# Patient Record
Sex: MALE | Race: BLACK OR AFRICAN AMERICAN | Employment: FULL TIME | URBAN - METROPOLITAN AREA
[De-identification: names, ages, dates, MRNs, and addresses within clinical notes are randomized per-mention and may not be internally consistent; named-entity substitution may affect disease eponyms.]

---

## 2021-06-23 ENCOUNTER — OFFICE VISIT (OUTPATIENT)
Dept: ORTHOPEDIC SURGERY | Age: 31
End: 2021-06-23

## 2021-06-23 VITALS
TEMPERATURE: 97.7 F | OXYGEN SATURATION: 100 % | BODY MASS INDEX: 20.05 KG/M2 | WEIGHT: 143.2 LBS | HEART RATE: 64 BPM | HEIGHT: 71 IN

## 2021-06-23 DIAGNOSIS — S60.221A CONTUSION OF RIGHT HAND, INITIAL ENCOUNTER: Primary | ICD-10-CM

## 2021-06-23 DIAGNOSIS — M25.531 RIGHT WRIST PAIN: ICD-10-CM

## 2021-06-23 PROCEDURE — 73110 X-RAY EXAM OF WRIST: CPT | Performed by: ORTHOPAEDIC SURGERY

## 2021-06-23 PROCEDURE — 99203 OFFICE O/P NEW LOW 30 MIN: CPT | Performed by: ORTHOPAEDIC SURGERY

## 2021-06-23 NOTE — LETTER
NOTIFICATION RETURN TO WORK / SCHOOL    6/23/2021 8:49 AM    Mr. Faisal Benavidez [address] [facility] [address] [phone]      To Whom It May Concern:    Constantine Edmond is currently under the care of [address] [place] [address]. Patient was seen in our office today, he will be out of work until further notice. If there are questions or concerns please have the patient contact our office.         Sincerely,      Maria M Banda, DO

## 2021-06-23 NOTE — PROGRESS NOTES
Mague Cazares is a 26 y.o. male right handed . Worker's Compensation and legal considerations: none filed. Vitals:    06/23/21 0817   Pulse: 64   Temp: 97.7 °F (36.5 °C)   TempSrc: Temporal   SpO2: 100%   Weight: 143 lb 3.2 oz (65 kg)   Height: 5' 11\" (1.803 m)   PainSc:   8           Chief Complaint   Patient presents with    Thumb Pain     Right         HPI: Patient presents today with a history of fall onto his right hand with pain at his thenar eminence and base of his thumb ever since. Date of onset: Approximately 6/14/2021    Injury: Yes: Comment: Fall    Prior Treatment:  No    Numbness/ Tingling: No      ROS: Review of Systems - General ROS: negative  Psychological ROS: negative  ENT ROS: negative  Allergy and Immunology ROS: negative  Hematological and Lymphatic ROS: negative  Respiratory ROS: no cough, shortness of breath, or wheezing  Cardiovascular ROS: no chest pain or dyspnea on exertion  Gastrointestinal ROS: no abdominal pain, change in bowel habits, or black or bloody stools  Musculoskeletal ROS: positive for - pain in hand - right  Neurological ROS: negative  Dermatological ROS: negative    No past medical history on file. No past surgical history on file. No Known Allergies        PE:     Physical Exam  Vitals and nursing note reviewed. Constitutional:       General: He is not in acute distress. Appearance: Normal appearance. He is not ill-appearing. Cardiovascular:      Pulses: Normal pulses. Pulmonary:      Effort: Pulmonary effort is normal.   Musculoskeletal:         General: Swelling and tenderness present. No deformity or signs of injury. Normal range of motion. Cervical back: Normal range of motion. Right lower leg: No edema. Left lower leg: No edema. Skin:     General: Skin is warm and dry. Capillary Refill: Capillary refill takes less than 2 seconds. Findings: No bruising or erythema.    Neurological:      General: No focal deficit present. Mental Status: He is alert and oriented to person, place, and time. Psychiatric:         Mood and Affect: Mood normal.         Behavior: Behavior normal.            Right hand: There is exquisite tenderness to palpation about the thenar eminence. There is mild tenderness to palpation about the anatomic snuffbox. Sensation is intact distally and cap refill is brisk. Range of motion is near full in all digits. Imagin2021 3 views of right wrist including scaphoid view does not show any fracture or dislocation or any other osseous abnormalities. ICD-10-CM ICD-9-CM    1. Contusion of right hand, initial encounter  S60.221A 923.20 MRI HAND RT WO CONT      AMB SUPPLY ORDER   2. Right wrist pain  M25.531 719.43 AMB POC XRAY, WRIST; COMPLETE, 3+ VIE         Plan:     MRI right hand without contrast and right thumb spica brace. We will rule out a possible scaphoid fracture versus other soft tissue injury in the right hand. Follow-up and Dispositions    · Return for MRI review.           Plan was reviewed with patient, who verbalized agreement and understanding of the plan

## 2021-06-29 ENCOUNTER — TELEPHONE (OUTPATIENT)
Dept: ORTHOPEDIC SURGERY | Age: 31
End: 2021-06-29

## 2021-06-29 DIAGNOSIS — S60.221A CONTUSION OF RIGHT HAND, INITIAL ENCOUNTER: Primary | ICD-10-CM

## 2021-06-29 NOTE — TELEPHONE ENCOUNTER
madhu Apple (on HIPAA), is asking if we can send the order for the MRI of the right hand to [facility]. She is also asking if this can be done as a STAT order.       Ms. Jamison Honey [phone]

## 2021-06-30 ENCOUNTER — HOSPITAL ENCOUNTER (OUTPATIENT)
Dept: MRI IMAGING | Age: 31
Discharge: HOME | End: 2021-06-30
Attending: ORTHOPAEDIC SURGERY

## 2021-06-30 VITALS — BODY MASS INDEX: 20.92 KG/M2 | WEIGHT: 150 LBS

## 2021-06-30 DIAGNOSIS — S60.221A CONTUSION OF RIGHT HAND, INITIAL ENCOUNTER: ICD-10-CM

## 2021-06-30 PROCEDURE — A9577 INJ MULTIHANCE: HCPCS | Performed by: ORTHOPAEDIC SURGERY

## 2021-06-30 PROCEDURE — 74011250636 HC RX REV CODE- 250/636: Performed by: ORTHOPAEDIC SURGERY

## 2021-06-30 PROCEDURE — 73220 MRI UPPR EXTREMITY W/O&W/DYE: CPT

## 2021-06-30 RX ADMIN — GADOBENATE DIMEGLUMINE 15 ML: 529 INJECTION, SOLUTION INTRAVENOUS at 18:09

## 2021-06-30 NOTE — TELEPHONE ENCOUNTER
Orders signed and faxed with confirmation to [address] [place] [address] to call patient and patient fiance, but numbers were not in service.

## 2021-07-02 ENCOUNTER — TELEPHONE (OUTPATIENT)
Dept: ORTHOPEDIC SURGERY | Age: 31
End: 2021-07-02

## 2021-07-02 NOTE — LETTER
NOTIFICATION RETURN TO WORK / SCHOOL    7/2/2021 4:18 PM    Mr. Canelo Card  [address] [facility] [phone]      To Whom It May Concern:    Ha Bradshaw is currently under the care of [facility] [place] [facility] [place]. He will return to work on full duty with the use of brace to his right hand. If there are questions or concerns please have the patient contact our office.         Sincerely,      Katey Bear, DO

## 2021-07-02 NOTE — TELEPHONE ENCOUNTER
Patient had his MRI done. He did call to schedule to review. However, he is trying badly to be able to return to duty as soon as possible, and they will not allow it until he is cleared by provider. Being that availability is out a ways could this be reviewed via a possible phone call and if clearance is possible a letter written for patient? Please advise.      Patient [phone]

## 2021-07-02 NOTE — TELEPHONE ENCOUNTER
Please let patient know that he can return to work as long as he continues to wear his brace. He should make an appointment to see me within the next 4 weeks. He can have a letter to return to work full duty as long as he is wearing his brace. Let him know that I have reviewed the MRI and it shows a high-grade sprain at his thumb that could easily turn into a complete rupture without brace wear.

## 2021-07-02 NOTE — TELEPHONE ENCOUNTER
Pt informed of message and letter has been written at this time. Pt states that he will be at the office on Tuesday to pick it up.

## 2021-07-06 ENCOUNTER — TELEPHONE (OUTPATIENT)
Dept: ORTHOPEDIC SURGERY | Age: 31
End: 2021-07-06

## 2021-07-06 NOTE — TELEPHONE ENCOUNTER
Spoke with Edita Gilliam. Edita Gilliam verbalized understanding that patient may return to work full duty, with the brace on.

## 2021-07-06 NOTE — TELEPHONE ENCOUNTER
Maryjane Reid from [employer] called stating she needs clarification on the patient's note to return to work. She stated she wants to confirm the patient can work full duty with a brace, or does he need restrictions. Maryjane Reid can be reached at [phone].